# Patient Record
Sex: FEMALE | Race: WHITE | NOT HISPANIC OR LATINO | Employment: UNEMPLOYED | URBAN - NONMETROPOLITAN AREA
[De-identification: names, ages, dates, MRNs, and addresses within clinical notes are randomized per-mention and may not be internally consistent; named-entity substitution may affect disease eponyms.]

---

## 2017-08-31 ENCOUNTER — OFFICE VISIT (OUTPATIENT)
Dept: OBSTETRICS AND GYNECOLOGY | Facility: CLINIC | Age: 42
End: 2017-08-31

## 2017-08-31 VITALS
WEIGHT: 257 LBS | HEIGHT: 70 IN | SYSTOLIC BLOOD PRESSURE: 140 MMHG | DIASTOLIC BLOOD PRESSURE: 85 MMHG | BODY MASS INDEX: 36.79 KG/M2

## 2017-08-31 DIAGNOSIS — Z01.419 ENCOUNTER FOR ANNUAL ROUTINE GYNECOLOGICAL EXAMINATION: Primary | ICD-10-CM

## 2017-08-31 DIAGNOSIS — E66.9 OBESITY, CLASS II, BMI 35-39.9: Chronic | ICD-10-CM

## 2017-08-31 DIAGNOSIS — F33.41 RECURRENT MAJOR DEPRESSIVE DISORDER, IN PARTIAL REMISSION (HCC): Chronic | ICD-10-CM

## 2017-08-31 DIAGNOSIS — N92.6 IRREGULAR MENSES: Chronic | ICD-10-CM

## 2017-08-31 PROCEDURE — 99386 PREV VISIT NEW AGE 40-64: CPT | Performed by: OBSTETRICS & GYNECOLOGY

## 2017-08-31 PROCEDURE — 87624 HPV HI-RISK TYP POOLED RSLT: CPT | Performed by: OBSTETRICS & GYNECOLOGY

## 2017-08-31 PROCEDURE — 88142 CYTOPATH C/V THIN LAYER: CPT | Performed by: OBSTETRICS & GYNECOLOGY

## 2017-08-31 RX ORDER — CETIRIZINE HYDROCHLORIDE 10 MG/1
10 TABLET ORAL DAILY
COMMUNITY

## 2017-08-31 RX ORDER — FLUTICASONE PROPIONATE 50 MCG
2 SPRAY, SUSPENSION (ML) NASAL DAILY
COMMUNITY

## 2017-08-31 RX ORDER — BUPROPION HYDROCHLORIDE 150 MG/1
150 TABLET, EXTENDED RELEASE ORAL 2 TIMES DAILY
Qty: 60 TABLET | Refills: 11 | Status: SHIPPED | OUTPATIENT
Start: 2017-08-31

## 2017-09-05 LAB
LAB AP CASE REPORT: NORMAL
LAB AP GYN ADDITIONAL INFORMATION: NORMAL
Lab: NORMAL
PATH INTERP SPEC-IMP: NORMAL
STAT OF ADQ CVX/VAG CYTO-IMP: NORMAL

## 2017-09-07 LAB — HPV I/H RISK 4 DNA CVX QL PROBE+SIG AMP: NEGATIVE

## 2017-10-16 ENCOUNTER — OFFICE VISIT (OUTPATIENT)
Dept: OBSTETRICS AND GYNECOLOGY | Facility: CLINIC | Age: 42
End: 2017-10-16

## 2017-10-16 VITALS
SYSTOLIC BLOOD PRESSURE: 132 MMHG | BODY MASS INDEX: 35.65 KG/M2 | HEIGHT: 70 IN | DIASTOLIC BLOOD PRESSURE: 91 MMHG | WEIGHT: 249 LBS

## 2017-10-16 DIAGNOSIS — F33.41 RECURRENT MAJOR DEPRESSIVE DISORDER, IN PARTIAL REMISSION (HCC): Primary | Chronic | ICD-10-CM

## 2017-10-16 DIAGNOSIS — N92.6 IRREGULAR MENSES: Chronic | ICD-10-CM

## 2017-10-16 DIAGNOSIS — E66.9 OBESITY, CLASS II, BMI 35-39.9: Chronic | ICD-10-CM

## 2017-10-16 PROCEDURE — 99213 OFFICE O/P EST LOW 20 MIN: CPT | Performed by: OBSTETRICS & GYNECOLOGY

## 2019-04-28 NOTE — PROGRESS NOTES
Massiel Still is a 42 y.o. y/o female     Chief Complaint: Lethargy and depression and difficulty losing weight and heavy periods and dysmenorrhea.    HPI: 42-year-old  002 with two prior  sections.  Her last normal menstrual period was 2017.  She just turned 42 years old.  He has never had a mammogram.  She doesn't currently have any health insurance.    Her periods are irregular with missed menses and dysmenorrhea, however this past menses was not as bad.     She has had some mood swings, anxiety, depression, and difficulty sleeping.  She previously had never taken any antidepressants.  She thinks she's had some hot flashes.     On 2017 we talked about supplements with vitamin D and B12, and we discussed stress reduction, and we discussed trying Wellbutrin.  She is not having any problems with the Wellbutrin, and it is helped with her mood swings, anxiety, depression, and difficulty sleeping.  She has also lost 9 pounds.    Pap smear on 2017 was negative for intraepithelial lesion or neoplasia and high risk HPV was negative.    We discussed continuing the Wellbutrin and continuing diet and exercise and following up in 2 months, and she is agreeable to this plan.    Review of Systems   Constitutional: Positive for fatigue ( Improved). Negative for activity change, appetite change, chills, diaphoresis, fever and unexpected weight change.   Gastrointestinal: Negative for abdominal pain, constipation, diarrhea and nausea.   Genitourinary: Positive for menstrual problem. Negative for difficulty urinating, dyspareunia, dysuria, pelvic pain, urgency, vaginal bleeding, vaginal discharge and vaginal pain.   Neurological: Negative for headaches.   Psychiatric/Behavioral: Positive for dysphoric mood ( Improved). The patient is not nervous/anxious.    All other systems reviewed and are negative.     Breast ROS: negative    The following portions of the patient's history were  "reviewed and updated as appropriate: allergies, current medications, past family history, past medical history, past social history, past surgical history and problem list.    No Known Allergies       Current Outpatient Prescriptions:   •  buPROPion SR (WELLBUTRIN SR) 150 MG 12 hr tablet, Take 1 tablet by mouth 2 (Two) Times a Day., Disp: 60 tablet, Rfl: 11  •  cetirizine (zyrTEC) 10 MG tablet, Take 10 mg by mouth Daily., Disp: , Rfl:   •  fluticasone (FLONASE) 50 MCG/ACT nasal spray, 2 sprays into each nostril Daily., Disp: , Rfl:      The patient has a family history of   Family History   Problem Relation Age of Onset   • Hypertension Father         Past Medical History:   Diagnosis Date   • Abnormal Pap smear of cervix    • Irregular menses 2017   • Obesity, Class II, BMI 35-39.9 2017   • Ovarian cyst    • Recurrent major depressive disorder, in partial remission 2017   • Urinary tract infection         OB History     No data available           Social History     Social History   • Marital status: Unknown     Spouse name: N/A   • Number of children: N/A   • Years of education: N/A     Occupational History   • Not on file.     Social History Main Topics   • Smoking status: Former Smoker   • Smokeless tobacco: Never Used   • Alcohol use No   • Drug use: No   • Sexual activity: Yes     Partners: Male     Birth control/ protection: None     Other Topics Concern   • Not on file     Social History Narrative        Past Surgical History:   Procedure Laterality Date   •  SECTION   Years ,   • ORIF TIBIAL SHAFT FRACTURE W/ PLATES AND SCREWS     • TONSILLECTOMY          Patient Active Problem List   Diagnosis   • Obesity, Class II, BMI 35-39.9   • Recurrent major depressive disorder, in partial remission   • Irregular menses        Documented Vitals    10/16/17 0931   BP: 132/91   Weight: 249 lb (113 kg)   Height: 70\" (177.8 cm)   PainSc: 0-No pain       Physical Exam   Constitutional: " She is oriented to person, place, and time. No distress.   Obese white female weighing 249 pounds with BMI 35.7.   HENT:   Head: Normocephalic and atraumatic.   Eyes: Conjunctivae and EOM are normal. Pupils are equal, round, and reactive to light.   Neck: Normal range of motion. Neck supple. No JVD present. No tracheal deviation present. No thyromegaly present.   Cardiovascular: Normal rate, regular rhythm, normal heart sounds and intact distal pulses.  Exam reveals no gallop and no friction rub.    No murmur heard.  Pulmonary/Chest: Effort normal and breath sounds normal. No stridor. No respiratory distress. She has no wheezes. She has no rales. She exhibits no tenderness.   Abdominal: Soft. Bowel sounds are normal. She exhibits no distension and no mass. There is no tenderness. There is no rebound and no guarding. No hernia.   Musculoskeletal: Normal range of motion. She exhibits no edema, tenderness or deformity.   Lymphadenopathy:     She has no cervical adenopathy.   Neurological: She is alert and oriented to person, place, and time. She has normal reflexes. She displays normal reflexes. No cranial nerve deficit. She exhibits normal muscle tone. Coordination normal.   Skin: Skin is warm and dry. No rash noted. She is not diaphoretic. No erythema. No pallor.   Psychiatric: She has a normal mood and affect. Her behavior is normal. Judgment and thought content normal.   Nursing note and vitals reviewed.      Assessment        Diagnosis Plan   1. Recurrent major depressive disorder, in partial remission     2. Irregular menses     3. Obesity, Class II, BMI 35-39.9           Plan      1. January Wellbutrin  mg twice daily.    2. Encouraged in diet and exercise.  3. Follow-up in 2 months.  Follow-up sooner as needed.            This document has been electronically signed by Omar Delgado MD on October 16, 2017 10:02 AM      denies pain/discomfort